# Patient Record
Sex: FEMALE | Race: WHITE | NOT HISPANIC OR LATINO | Employment: OTHER | URBAN - METROPOLITAN AREA
[De-identification: names, ages, dates, MRNs, and addresses within clinical notes are randomized per-mention and may not be internally consistent; named-entity substitution may affect disease eponyms.]

---

## 2022-07-25 ENCOUNTER — LAB REQUISITION (OUTPATIENT)
Dept: LAB | Facility: CLINIC | Age: 37
End: 2022-07-25

## 2022-07-25 DIAGNOSIS — Z34.80 ENCOUNTER FOR SUPERVISION OF OTHER NORMAL PREGNANCY, UNSPECIFIED TRIMESTER: ICD-10-CM

## 2022-07-25 LAB
ERYTHROCYTE [DISTWIDTH] IN BLOOD BY AUTOMATED COUNT: 13.1 % (ref 10–15)
HCT VFR BLD AUTO: 35.8 % (ref 35–47)
HGB BLD-MCNC: 12.1 G/DL (ref 11.7–15.7)
MCH RBC QN AUTO: 30.9 PG (ref 26.5–33)
MCHC RBC AUTO-ENTMCNC: 33.8 G/DL (ref 31.5–36.5)
MCV RBC AUTO: 92 FL (ref 78–100)
PLATELET # BLD AUTO: 298 10E3/UL (ref 150–450)
RBC # BLD AUTO: 3.91 10E6/UL (ref 3.8–5.2)
WBC # BLD AUTO: 9.7 10E3/UL (ref 4–11)

## 2022-07-25 PROCEDURE — 85014 HEMATOCRIT: CPT | Performed by: FAMILY MEDICINE

## 2022-08-22 ENCOUNTER — LAB REQUISITION (OUTPATIENT)
Dept: LAB | Facility: CLINIC | Age: 37
End: 2022-08-22

## 2022-08-22 DIAGNOSIS — Z83.49 FAMILY HISTORY OF OTHER ENDOCRINE, NUTRITIONAL AND METABOLIC DISEASES: ICD-10-CM

## 2022-08-22 LAB — TSH SERPL DL<=0.005 MIU/L-ACNC: 1.95 UIU/ML (ref 0.3–4.2)

## 2022-08-22 PROCEDURE — 84443 ASSAY THYROID STIM HORMONE: CPT | Performed by: FAMILY MEDICINE

## 2022-08-22 PROCEDURE — 86376 MICROSOMAL ANTIBODY EACH: CPT | Performed by: FAMILY MEDICINE

## 2022-08-23 LAB — THYROPEROXIDASE AB SERPL-ACNC: 165 IU/ML

## 2022-09-22 ENCOUNTER — LAB REQUISITION (OUTPATIENT)
Dept: LAB | Facility: CLINIC | Age: 37
End: 2022-09-22

## 2022-09-22 DIAGNOSIS — Z34.80 ENCOUNTER FOR SUPERVISION OF OTHER NORMAL PREGNANCY, UNSPECIFIED TRIMESTER: ICD-10-CM

## 2022-09-22 PROCEDURE — 87491 CHLMYD TRACH DNA AMP PROBE: CPT | Performed by: FAMILY MEDICINE

## 2022-09-23 LAB
C TRACH DNA SPEC QL PROBE+SIG AMP: NEGATIVE
N GONORRHOEA DNA SPEC QL NAA+PROBE: NEGATIVE

## 2022-09-30 ENCOUNTER — LAB REQUISITION (OUTPATIENT)
Dept: LAB | Facility: CLINIC | Age: 37
End: 2022-09-30

## 2022-09-30 DIAGNOSIS — R35.0 FREQUENCY OF MICTURITION: ICD-10-CM

## 2022-09-30 PROCEDURE — 87086 URINE CULTURE/COLONY COUNT: CPT | Performed by: FAMILY MEDICINE

## 2022-10-02 LAB — BACTERIA UR CULT: ABNORMAL

## 2022-11-02 ENCOUNTER — LAB REQUISITION (OUTPATIENT)
Dept: LAB | Facility: CLINIC | Age: 37
End: 2022-11-02

## 2022-11-02 DIAGNOSIS — Z34.80 ENCOUNTER FOR SUPERVISION OF OTHER NORMAL PREGNANCY, UNSPECIFIED TRIMESTER: ICD-10-CM

## 2022-11-02 DIAGNOSIS — O26.852 SPOTTING COMPLICATING PREGNANCY, SECOND TRIMESTER: ICD-10-CM

## 2022-11-02 PROCEDURE — 86780 TREPONEMA PALLIDUM: CPT | Performed by: FAMILY MEDICINE

## 2022-11-02 PROCEDURE — 87491 CHLMYD TRACH DNA AMP PROBE: CPT | Performed by: FAMILY MEDICINE

## 2022-11-02 PROCEDURE — 87591 N.GONORRHOEAE DNA AMP PROB: CPT | Performed by: FAMILY MEDICINE

## 2022-11-03 LAB
C TRACH DNA SPEC QL PROBE+SIG AMP: NEGATIVE
N GONORRHOEA DNA SPEC QL NAA+PROBE: NEGATIVE
T PALLIDUM AB SER QL: NONREACTIVE

## 2022-11-30 ENCOUNTER — TRANSFERRED RECORDS (OUTPATIENT)
Dept: HEALTH INFORMATION MANAGEMENT | Facility: CLINIC | Age: 37
End: 2022-11-30

## 2022-12-13 ENCOUNTER — TRANSFERRED RECORDS (OUTPATIENT)
Dept: HEALTH INFORMATION MANAGEMENT | Facility: CLINIC | Age: 37
End: 2022-12-13

## 2023-01-09 ENCOUNTER — HOSPITAL ENCOUNTER (INPATIENT)
Facility: CLINIC | Age: 38
Setting detail: SURGERY ADMIT
End: 2023-01-09
Attending: OBSTETRICS & GYNECOLOGY | Admitting: OBSTETRICS & GYNECOLOGY

## 2023-01-09 RX ORDER — ACETAMINOPHEN 325 MG/1
975 TABLET ORAL ONCE
Status: CANCELLED | OUTPATIENT
Start: 2023-01-09 | End: 2023-01-09

## 2023-01-09 RX ORDER — SODIUM CHLORIDE, SODIUM LACTATE, POTASSIUM CHLORIDE, CALCIUM CHLORIDE 600; 310; 30; 20 MG/100ML; MG/100ML; MG/100ML; MG/100ML
INJECTION, SOLUTION INTRAVENOUS CONTINUOUS
Status: CANCELLED | OUTPATIENT
Start: 2023-01-09

## 2023-01-09 RX ORDER — MISOPROSTOL 200 UG/1
400 TABLET ORAL
Status: CANCELLED | OUTPATIENT
Start: 2023-01-09

## 2023-01-09 RX ORDER — METHYLERGONOVINE MALEATE 0.2 MG/ML
200 INJECTION INTRAVENOUS
Status: CANCELLED | OUTPATIENT
Start: 2023-01-09

## 2023-01-09 RX ORDER — OXYTOCIN/0.9 % SODIUM CHLORIDE 30/500 ML
100-340 PLASTIC BAG, INJECTION (ML) INTRAVENOUS CONTINUOUS PRN
Status: CANCELLED | OUTPATIENT
Start: 2023-01-09

## 2023-01-09 RX ORDER — OXYTOCIN/0.9 % SODIUM CHLORIDE 30/500 ML
340 PLASTIC BAG, INJECTION (ML) INTRAVENOUS CONTINUOUS PRN
Status: CANCELLED | OUTPATIENT
Start: 2023-01-09

## 2023-01-09 RX ORDER — LIDOCAINE 40 MG/G
CREAM TOPICAL
Status: CANCELLED | OUTPATIENT
Start: 2023-01-09

## 2023-01-09 RX ORDER — CARBOPROST TROMETHAMINE 250 UG/ML
250 INJECTION, SOLUTION INTRAMUSCULAR
Status: CANCELLED | OUTPATIENT
Start: 2023-01-09

## 2023-01-09 RX ORDER — MISOPROSTOL 200 UG/1
800 TABLET ORAL
Status: CANCELLED | OUTPATIENT
Start: 2023-01-09

## 2023-01-09 RX ORDER — CEFAZOLIN SODIUM 2 G/100ML
2 INJECTION, SOLUTION INTRAVENOUS
Status: CANCELLED | OUTPATIENT
Start: 2023-01-09

## 2023-01-09 RX ORDER — CITRIC ACID/SODIUM CITRATE 334-500MG
30 SOLUTION, ORAL ORAL
Status: CANCELLED | OUTPATIENT
Start: 2023-01-09

## 2023-01-09 RX ORDER — OXYTOCIN 10 [USP'U]/ML
10 INJECTION, SOLUTION INTRAMUSCULAR; INTRAVENOUS
Status: CANCELLED | OUTPATIENT
Start: 2023-01-09

## 2023-01-09 RX ORDER — CEFAZOLIN SODIUM 2 G/100ML
2 INJECTION, SOLUTION INTRAVENOUS SEE ADMIN INSTRUCTIONS
Status: CANCELLED | OUTPATIENT
Start: 2023-01-09

## 2023-04-26 ENCOUNTER — HOSPITAL ENCOUNTER (EMERGENCY)
Facility: CLINIC | Age: 38
Discharge: HOME | End: 2023-04-26
Attending: EMERGENCY MEDICINE | Admitting: EMERGENCY MEDICINE
Payer: COMMERCIAL

## 2023-04-26 VITALS
DIASTOLIC BLOOD PRESSURE: 68 MMHG | WEIGHT: 140 LBS | SYSTOLIC BLOOD PRESSURE: 119 MMHG | BODY MASS INDEX: 25.76 KG/M2 | TEMPERATURE: 98.3 F | HEIGHT: 62 IN | HEART RATE: 80 BPM | OXYGEN SATURATION: 99 % | RESPIRATION RATE: 16 BRPM

## 2023-04-26 DIAGNOSIS — H92.02 OTALGIA, LEFT: ICD-10-CM

## 2023-04-26 PROCEDURE — 99283 EMERGENCY DEPT VISIT LOW MDM: CPT

## 2023-04-26 PROCEDURE — 250N000012 HC RX MED GY IP 250 OP 636 PS 637: Performed by: EMERGENCY MEDICINE

## 2023-04-26 RX ORDER — FEXOFENADINE HCL AND PSEUDOEPHEDRINE HCI 60; 120 MG/1; MG/1
1 TABLET, EXTENDED RELEASE ORAL 2 TIMES DAILY
Qty: 28 TABLET | Refills: 0 | Status: SHIPPED | OUTPATIENT
Start: 2023-04-26

## 2023-04-26 RX ADMIN — DEXAMETHASONE 6 MG: 2 TABLET ORAL at 20:58

## 2023-04-26 ASSESSMENT — ACTIVITIES OF DAILY LIVING (ADL): ADLS_ACUITY_SCORE: 35

## 2023-04-27 NOTE — ED PROVIDER NOTES
"  History   Chief Complaint:  Otalgia    The history is provided by the patient.      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 Claudia notes that she had a left-sided extraction recently and just finished antibiotics. She describes muffled hearing without significant pain. She does have relief of pain with pulling of the ear.     Independent Historian:   None - Patient Only    Review of External Notes:     ROS:  Review of Systems   HENT: Positive for ear pain.      Allergies:  Sulfites  Blood-Group Specific Substance  Sulfa Antibiotics     Medications:    The patient has no daily or prescription medications.     Past Medical History:    Migraine with aura   Meningitis      Past Surgical History:     section x 2    Family History:    Father- diabetes, hypertension   Mother- leukemia     Social History:  Presents to the emergency department with her son.   PCP: Nathaniel Ybarra MD    Physical Exam     Patient Vitals for the past 24 hrs:   BP Temp Temp src Pulse Resp SpO2 Height Weight   23 119/68 98.3  F (36.8  C) Temporal 80 16 99 % 1.575 m (5' 2\") 63.5 kg (140 lb)     Physical Exam  Vitals reviewed.   HENT:      Right Ear: Tympanic membrane normal.      Ears:      Comments: There is slight fullness and fluid clear in the middle ears without signs of secondary infection.     Mouth/Throat:      Mouth: Mucous membranes are moist.   Eyes:      Pupils: Pupils are equal, round, and reactive to light.   Neurological:      General: No focal deficit present.      Mental Status: She is alert.   Psychiatric:      Comments: Seems anxious.         Emergency Department Course     Emergency Department Course & Assessments:    Interventions:  Medications   dexamethasone (DECADRON) tablet 10 mg (6 mg Oral $Given 23)     Assessments:   I obtained history and examined the patient as noted " "above. I discussed findings and discharge. All questions answered.   2054 I rechecked the patient and answered further questions.     Independent Interpretation (X-rays, CTs, rhythm strip):  None    Consultations/Discussion of Management or Tests:  None     Social Determinants of Health affecting care:   None    Disposition:  The patient was discharged to home.     Impression & Plan      Medical Decision Making:  Patient presents with son who is here for different complaint.  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  Now has distant decreased hearing in the left.  There is some pain involved but examination shows fluid without secondary infection and do not feel antibiotics are appropriate offered single dose dexamethasone for eustachian tube dysfunction as well as Sudafed.  Patient seems quite anxious about her hearing also feels like she is \"sensitive to meds.  Patient is encouraged to follow-up with ENT no further work-up required in the emergency room and was discharged home in stable condition.    Diagnosis:    ICD-10-CM    1. Otalgia, left  H92.02         Discharge Medications:  New Prescriptions    FEXOFENADINE-PSEUDOEPHEDRINE (ALLEGRA-D)  MG 12 HR TABLET    Take 1 tablet by mouth 2 times daily      Scribe Disclosure:  I, Cathleen Carranza, am serving as a scribe at 8:25 PM on 4/26/2023 to document services personally performed by Nam Santos MD based on my observations and the provider's statements to me.    4/26/2023   Nam Santos MD Goodman, Brian Samuel, MD  04/26/23 2301    "

## 2023-04-27 NOTE — DISCHARGE INSTRUCTIONS
We suspect the muffled hearing in the ear pain is due to pressure in the ear from fluid but there is no current signs of infection therefore we do not think antibiotics are appropriate.  We have given you a strong anti-inflammatory.  Please use decongestant like Allegra-D to help with eustachian tube dysfunction.  Return with severe increase in pain follow-up with ENT if you have ongoing hearing concerns.

## 2023-04-27 NOTE — ED TRIAGE NOTES
Pt has left ear pain and hearing if muffled. No meds today.     Recently finished antibiotics for extraction.      Triage Assessment     Row Name 04/26/23 2012       Triage Assessment (Adult)    Airway WDL WDL       Respiratory WDL    Respiratory WDL WDL       Skin Circulation/Temperature WDL    Skin Circulation/Temperature WDL WDL       Cardiac WDL    Cardiac WDL WDL       Peripheral/Neurovascular WDL    Peripheral Neurovascular WDL WDL       Cognitive/Neuro/Behavioral WDL    Cognitive/Neuro/Behavioral WDL WDL

## 2023-09-17 ENCOUNTER — HOSPITAL ENCOUNTER (EMERGENCY)
Facility: CLINIC | Age: 38
Discharge: HOME | End: 2023-09-17
Attending: EMERGENCY MEDICINE | Admitting: EMERGENCY MEDICINE
Payer: COMMERCIAL

## 2023-09-17 VITALS
TEMPERATURE: 97.6 F | SYSTOLIC BLOOD PRESSURE: 112 MMHG | DIASTOLIC BLOOD PRESSURE: 82 MMHG | RESPIRATION RATE: 18 BRPM | HEART RATE: 85 BPM | OXYGEN SATURATION: 100 %

## 2023-09-17 DIAGNOSIS — H65.92 LEFT SEROUS OTITIS MEDIA, UNSPECIFIED CHRONICITY: ICD-10-CM

## 2023-09-17 DIAGNOSIS — R68.84 JAW PAIN: ICD-10-CM

## 2023-09-17 PROCEDURE — 99283 EMERGENCY DEPT VISIT LOW MDM: CPT

## 2023-09-17 PROCEDURE — 250N000013 HC RX MED GY IP 250 OP 250 PS 637: Performed by: EMERGENCY MEDICINE

## 2023-09-17 RX ORDER — ACETAMINOPHEN 500 MG
1000 TABLET ORAL ONCE
Status: COMPLETED | OUTPATIENT
Start: 2023-09-17 | End: 2023-09-17

## 2023-09-17 RX ADMIN — ACETAMINOPHEN 500 MG: 500 TABLET, FILM COATED ORAL at 22:25

## 2023-09-18 NOTE — ED TRIAGE NOTES
Presents to ED with 3 days of left ear pain, head fullness. Pt also states over the last two days she has had increasingly blurred vision. States when she lays flat she can hear fine but that's the only time. Denies COVID test at this time, would rather have ear looked at. ABCs intact. A&OX4.      Triage Assessment       Row Name 09/17/23 2020       Triage Assessment (Adult)    Airway WDL WDL       Respiratory WDL    Respiratory WDL WDL       Skin Circulation/Temperature WDL    Skin Circulation/Temperature WDL WDL       Cardiac WDL    Cardiac WDL WDL       Peripheral/Neurovascular WDL    Peripheral Neurovascular WDL WDL       Cognitive/Neuro/Behavioral WDL    Cognitive/Neuro/Behavioral WDL WDL

## 2023-09-18 NOTE — ED PROVIDER NOTES
History     Chief Complaint:  Otalgia and Head Pain       HPI   Claudiapelon Burns is a 37 year old female 3 days sinus congestion sinus pain left ear pain runny nose and kids with viral illnesses at home.  Feel like left ear is underwater.  No Facial swelling.  No fever.  No rash.  No Cough SOB CP.        Independent Historian:        Review of External Notes:      Medications:    fexofenadine-pseudoePHEDrine (ALLEGRA-D)  MG 12 hr tablet        Past Medical History:    No past medical history on file.    Past Surgical History:    No past surgical history on file.       Physical Exam   Patient Vitals for the past 24 hrs:   BP Temp Temp src Pulse Resp SpO2   09/17/23 2022 112/82 97.6  F (36.4  C) Temporal 85 18 100 %        Physical Exam  General: Patient is well appearing. No distress.  Head: Atraumatic.  Ear Right tm canal normal.  LT TM clear fluid not bulging not red normal canal.    Mouth TMJ intact no dental swelling or abscesses.    Eyes: Conjunctivae and EOM are normal. No scleral icterus.  Neck: Normal range of motion. Neck supple.   Cardiovascular: Normal rate, regular rhythm, normal heart sounds and intact distal pulses.   Pulmonary/Chest: Breath sounds normal. No respiratory distress.  Abdominal: Soft. Bowel sounds are normal. No distension. No tenderness. No rebound or guarding.   Musculoskeletal: Normal range of motion.  Skin: Warm and dry. No rash noted. Not diaphoretic.      Emergency Department Course       Laboratory:  Labs Ordered and Resulted from Time of ED Arrival to Time of ED Departure - No data to display     Emergency Department Course & Assessments:             Interventions:  Medications - No data to display     Assessments:    Independent Interpretation (X-rays, CTs, rhythm strip):    Consultations/Discussion of Management or Tests:         Social Determinants of Health affecting care:       Disposition:  The patient was discharged to home.     Impression & Plan        Medical  Decision Making:  Serous otitis not infected.  Decongestants.  No facial swelling or other infection.  Teeth look ok but dental possible.  Throat normal Was really here specifically for an MD to look in her ear very reassured.  Suggested PCP and dentist follow up.     Diagnosis:    ICD-10-CM    1. Left serous otitis media, unspecified chronicity  H65.92       2. Jaw pain  R68.84            Discharge Medications:  New Prescriptions    No medications on file            9/17/2023   Adam Abad MD Stevens, Andrew C, MD  09/17/23 4537

## 2024-03-14 ENCOUNTER — LAB REQUISITION (OUTPATIENT)
Dept: LAB | Facility: CLINIC | Age: 39
End: 2024-03-14

## 2024-03-14 DIAGNOSIS — Z13.9 ENCOUNTER FOR SCREENING, UNSPECIFIED: ICD-10-CM

## 2024-03-14 DIAGNOSIS — Z13.1 ENCOUNTER FOR SCREENING FOR DIABETES MELLITUS: ICD-10-CM

## 2024-03-14 DIAGNOSIS — E55.9 VITAMIN D DEFICIENCY, UNSPECIFIED: ICD-10-CM

## 2024-03-14 DIAGNOSIS — Z13.29 ENCOUNTER FOR SCREENING FOR OTHER SUSPECTED ENDOCRINE DISORDER: ICD-10-CM

## 2024-03-14 DIAGNOSIS — Z13.0 ENCOUNTER FOR SCREENING FOR DISEASES OF THE BLOOD AND BLOOD-FORMING ORGANS AND CERTAIN DISORDERS INVOLVING THE IMMUNE MECHANISM: ICD-10-CM

## 2024-03-14 DIAGNOSIS — Z13.220 ENCOUNTER FOR SCREENING FOR LIPOID DISORDERS: ICD-10-CM

## 2024-03-14 LAB
ERYTHROCYTE [DISTWIDTH] IN BLOOD BY AUTOMATED COUNT: 13 % (ref 10–15)
HBA1C MFR BLD: 5.4 %
HCT VFR BLD AUTO: 41.3 % (ref 35–47)
HGB BLD-MCNC: 13.4 G/DL (ref 11.7–15.7)
HOLD SPECIMEN: NORMAL
MCH RBC QN AUTO: 30 PG (ref 26.5–33)
MCHC RBC AUTO-ENTMCNC: 32.4 G/DL (ref 31.5–36.5)
MCV RBC AUTO: 92 FL (ref 78–100)
PLATELET # BLD AUTO: 350 10E3/UL (ref 150–450)
RBC # BLD AUTO: 4.47 10E6/UL (ref 3.8–5.2)
WBC # BLD AUTO: 5.8 10E3/UL (ref 4–11)

## 2024-03-14 PROCEDURE — 80061 LIPID PANEL: CPT | Performed by: OBSTETRICS & GYNECOLOGY

## 2024-03-14 PROCEDURE — 80053 COMPREHEN METABOLIC PANEL: CPT | Performed by: OBSTETRICS & GYNECOLOGY

## 2024-03-14 PROCEDURE — 83036 HEMOGLOBIN GLYCOSYLATED A1C: CPT | Performed by: OBSTETRICS & GYNECOLOGY

## 2024-03-14 PROCEDURE — 84439 ASSAY OF FREE THYROXINE: CPT | Performed by: OBSTETRICS & GYNECOLOGY

## 2024-03-14 PROCEDURE — 84443 ASSAY THYROID STIM HORMONE: CPT | Performed by: OBSTETRICS & GYNECOLOGY

## 2024-03-14 PROCEDURE — 82306 VITAMIN D 25 HYDROXY: CPT | Performed by: OBSTETRICS & GYNECOLOGY

## 2024-03-14 PROCEDURE — 85027 COMPLETE CBC AUTOMATED: CPT | Performed by: OBSTETRICS & GYNECOLOGY

## 2024-03-15 LAB
ALBUMIN SERPL BCG-MCNC: 4.4 G/DL (ref 3.5–5.2)
ALP SERPL-CCNC: 61 U/L (ref 40–150)
ALT SERPL W P-5'-P-CCNC: 17 U/L (ref 0–50)
ANION GAP SERPL CALCULATED.3IONS-SCNC: 10 MMOL/L (ref 7–15)
AST SERPL W P-5'-P-CCNC: 26 U/L (ref 0–45)
BILIRUB SERPL-MCNC: 0.3 MG/DL
BUN SERPL-MCNC: 7.2 MG/DL (ref 6–20)
CALCIUM SERPL-MCNC: 9.2 MG/DL (ref 8.6–10)
CHLORIDE SERPL-SCNC: 104 MMOL/L (ref 98–107)
CHOLEST SERPL-MCNC: 180 MG/DL
CREAT SERPL-MCNC: 0.65 MG/DL (ref 0.51–0.95)
DEPRECATED HCO3 PLAS-SCNC: 25 MMOL/L (ref 22–29)
EGFRCR SERPLBLD CKD-EPI 2021: >90 ML/MIN/1.73M2
FASTING STATUS PATIENT QL REPORTED: NO
GLUCOSE SERPL-MCNC: 81 MG/DL (ref 70–99)
HDLC SERPL-MCNC: 56 MG/DL
LDLC SERPL CALC-MCNC: 110 MG/DL
NONHDLC SERPL-MCNC: 124 MG/DL
POTASSIUM SERPL-SCNC: 4 MMOL/L (ref 3.4–5.3)
PROT SERPL-MCNC: 7.8 G/DL (ref 6.4–8.3)
SODIUM SERPL-SCNC: 139 MMOL/L (ref 135–145)
T4 FREE SERPL-MCNC: 1.24 NG/DL (ref 0.9–1.7)
TRIGL SERPL-MCNC: 72 MG/DL
TSH SERPL DL<=0.005 MIU/L-ACNC: 4.35 UIU/ML (ref 0.3–4.2)
VIT D+METAB SERPL-MCNC: 33 NG/ML (ref 20–50)

## 2024-11-01 ENCOUNTER — HOSPITAL ENCOUNTER (EMERGENCY)
Facility: CLINIC | Age: 39
Discharge: HOME | End: 2024-11-01
Attending: PHYSICIAN ASSISTANT | Admitting: PHYSICIAN ASSISTANT
Payer: COMMERCIAL

## 2024-11-01 ENCOUNTER — APPOINTMENT (OUTPATIENT)
Dept: GENERAL RADIOLOGY | Facility: CLINIC | Age: 39
End: 2024-11-01
Attending: PHYSICIAN ASSISTANT
Payer: COMMERCIAL

## 2024-11-01 VITALS
OXYGEN SATURATION: 100 % | WEIGHT: 149.69 LBS | TEMPERATURE: 98.7 F | DIASTOLIC BLOOD PRESSURE: 70 MMHG | HEIGHT: 61 IN | RESPIRATION RATE: 16 BRPM | HEART RATE: 66 BPM | BODY MASS INDEX: 28.26 KG/M2 | SYSTOLIC BLOOD PRESSURE: 120 MMHG

## 2024-11-01 DIAGNOSIS — R09.A2 FOREIGN BODY SENSATION IN THROAT: ICD-10-CM

## 2024-11-01 PROCEDURE — 99283 EMERGENCY DEPT VISIT LOW MDM: CPT

## 2024-11-01 PROCEDURE — 70360 X-RAY EXAM OF NECK: CPT

## 2024-11-01 ASSESSMENT — ACTIVITIES OF DAILY LIVING (ADL)
ADLS_ACUITY_SCORE: 0

## 2024-11-01 ASSESSMENT — COLUMBIA-SUICIDE SEVERITY RATING SCALE - C-SSRS
1. IN THE PAST MONTH, HAVE YOU WISHED YOU WERE DEAD OR WISHED YOU COULD GO TO SLEEP AND NOT WAKE UP?: NO
2. HAVE YOU ACTUALLY HAD ANY THOUGHTS OF KILLING YOURSELF IN THE PAST MONTH?: NO
6. HAVE YOU EVER DONE ANYTHING, STARTED TO DO ANYTHING, OR PREPARED TO DO ANYTHING TO END YOUR LIFE?: NO

## 2024-11-02 NOTE — ED PROVIDER NOTES
"  Emergency Department Note      History of Present Illness     Chief Complaint   Throat Pain      MT Burns is a 39 year old female who presents to the ED for evaluation of foreign body sensation in throat.  Patient reports that she ate a taco 2 days ago and felt as though a part of it became lodged in the left side of her throat.  She has been able to eat and drink, however continues to have foreign body sensation of the left side of her throat.  She is maintaining her secretions.  No fevers or chills.    Independent Historian   None    Review of External Notes   None    Past Medical History     Medical History and Problem List   No past medical history on file.    Medications   fexofenadine-pseudoePHEDrine (ALLEGRA-D)  MG 12 hr tablet        Surgical History   No past surgical history on file.    Physical Exam     Patient Vitals for the past 24 hrs:   BP Temp Temp src Pulse Resp SpO2 Height Weight   11/01/24 2330 120/70 -- -- 66 16 100 % -- --   11/01/24 1922 122/80 98.7  F (37.1  C) Oral 75 16 100 % 1.549 m (5' 1\") 67.9 kg (149 lb 11.1 oz)     Physical Exam  Constitutional: Pleasant. Cooperative.   Eyes: Pupils equally round and reactive  HENT: Head is normal in appearance. Moist mucous membranes. No oropharyngeal erythema. Uvula midline. No trismus. No muffled voice. Tolerating their secretions.  Cardiovascular: Regular rate and rhythm.  Respiratory: Normal respiratory effort, lungs are clear bilaterally.  Neck: Normal ROM. No palpable abnormality.  Skin: Normal, without rash.  Neurologic: Cranial nerves grossly intact. Alert.  Nursing notes and vital signs reviewed.      Diagnostics     Lab Results   Labs Ordered and Resulted from Time of ED Arrival to Time of ED Departure - No data to display    Imaging   Neck soft tissue XR   Final Result   IMPRESSION: No definite radiodense foreign body. No prevertebral edema or soft tissue air. Normal appearance of the epiglottis and larynx. No airway " compromise. If there is persistent clinical concern, consider follow-up soft tissue neck CT.        Independent Interpretation   I did personally evaluate the XR no obvious radiopaque FB noted.    ED Course      Medications Administered   Medications - No data to display    Procedures   Procedures     Discussion of Management   None    ED Course        Additional Documentation  None    Medical Decision Making / Diagnosis     CMS Diagnoses: None    MIPS       None    MDM   Claudia Burns is a 39 year old female who presents ED with concern for retained swallowed foreign body.  Patient is maintaining secretions.  Patient is able to eat and drink.  No difficulty breathing.  See HPI as above for additional details.  Vitals and physical exam as above.  Discussed lower utility of x-ray in this setting, rather diagnostic testing of choice would be CT scan to rule out abscess or other pharyngeal abnormality.  Patient declined CT scan, noting concern for radiation exposure.  We did ultimately agreed to pursue x-ray to rule out radiopaque foreign body.  She understands limitations of this study.   discuss with patient unclear etiology of her symptoms at this time.  While it is certainly possible that she has some mild abrasion from what she swallowed a few days ago, discussed we cannot definitively rule out alternative nefarious etiology at this time in the absence of CT scan.  Patient noted understanding of this.  She will use Tylenol and ibuprofen for pain.  Patient notes appointment with ENT scheduled for Monday. Advised she keep this.  Discussed very low threshold to return to the ED with any new or worsening symptoms. Discussed reasons to return. All questions answered. Patient discharged to home in stable condition.      Disposition   The patient was discharged.     Diagnosis     ICD-10-CM    1. Foreign body sensation in throat  R09.A2            Discharge Medications   Discharge Medication List as of 11/1/2024 11:26  PM        This record was created at least in part using electronic voice recognition software, so please excuse any typographical errors.         Nathaniel Hernandez PA-C  11/02/24 0000

## 2024-11-02 NOTE — ED TRIAGE NOTES
Pt presents alone and reports 2 days ago she was eating a [employer] soft shell taco and felt something sharp when swallowing food. Couldn't bring it back up. Is getting worse now, voice has changed, hurts to talk. Painful to swallow. Mild drooling yesterday but none right now. Nausea but no emesis. Pt can still eat solids and drink fluids but with discomfort.

## 2024-11-02 NOTE — DISCHARGE INSTRUCTIONS
The exact cause of your sensation is unclear at this time. There is no evidence for glass or metal based upon x-ray at this time. You have elected to forego CT scan. Follow up with ear, nose, and throat as scheduled. Return at any point with worsening, such as fevers, severe worsening of pain, inability to tolerate your saliva.

## 2025-04-07 ENCOUNTER — LAB REQUISITION (OUTPATIENT)
Dept: LAB | Facility: CLINIC | Age: 40
End: 2025-04-07
Payer: COMMERCIAL

## 2025-04-07 DIAGNOSIS — E28.2 POLYCYSTIC OVARIAN SYNDROME: ICD-10-CM

## 2025-04-07 DIAGNOSIS — R53.82 CHRONIC FATIGUE, UNSPECIFIED: ICD-10-CM

## 2025-04-07 LAB
MIS SERPL-MCNC: 0.76 NG/ML (ref 0.03–5.5)
PROGEST SERPL-MCNC: 10.7 NG/ML
PROLACTIN SERPL 3RD IS-MCNC: 14 NG/ML (ref 5–23)
TSH SERPL DL<=0.005 MIU/L-ACNC: 3.8 UIU/ML (ref 0.3–4.2)
VIT D+METAB SERPL-MCNC: 102 NG/ML (ref 20–50)

## 2025-04-07 PROCEDURE — 82306 VITAMIN D 25 HYDROXY: CPT | Mod: ORL | Performed by: OBSTETRICS & GYNECOLOGY

## 2025-04-07 PROCEDURE — 84144 ASSAY OF PROGESTERONE: CPT | Mod: ORL | Performed by: OBSTETRICS & GYNECOLOGY

## 2025-04-07 PROCEDURE — 84146 ASSAY OF PROLACTIN: CPT | Mod: ORL | Performed by: OBSTETRICS & GYNECOLOGY

## 2025-04-07 PROCEDURE — 84443 ASSAY THYROID STIM HORMONE: CPT | Mod: ORL | Performed by: OBSTETRICS & GYNECOLOGY

## 2025-04-07 PROCEDURE — 82166 ASSAY ANTI-MULLERIAN HORM: CPT | Mod: ORL | Performed by: OBSTETRICS & GYNECOLOGY
